# Patient Record
(demographics unavailable — no encounter records)

---

## 2024-12-26 NOTE — HISTORY OF PRESENT ILLNESS
[FreeTextEntry1] : follow up [de-identified] : Pt with Hyperlipidemia currently taking atorvastatin 10mg daily at bedtime, also has prediabetes, reports walking for physical activity, hasn't  made any diet modifications. She is feeling well and here for follow up, requesting refill of atorvastatin. Reports tolerating the medication well with no adverse effects.  Denies muscle and joint aches, myalgia, headaches, chest pain, palpitations, shortness of breath, dizziness, lightheadedness, nausea, vomiting, abdominal discomfort, diarrhea, constipation.  Has no other concerns today. Has CPE scheduled with PCP in april 2025.

## 2024-12-26 NOTE — HEALTH RISK ASSESSMENT
[0] : 2) Feeling down, depressed, or hopeless: Not at all (0) [PHQ-2 Negative - No further assessment needed] : PHQ-2 Negative - No further assessment needed [[id number]] : 0

## 2024-12-26 NOTE — PHYSICAL EXAM
[No Acute Distress] : no acute distress [Well Nourished] : well nourished [Well Developed] : well developed [Well-Appearing] : well-appearing [Normal Sclera/Conjunctiva] : normal sclera/conjunctiva [No Respiratory Distress] : no respiratory distress  [No Accessory Muscle Use] : no accessory muscle use [Clear to Auscultation] : lungs were clear to auscultation bilaterally [Normal Rate] : normal rate  [Regular Rhythm] : with a regular rhythm [Soft] : abdomen soft [Normal Bowel Sounds] : normal bowel sounds [Coordination Grossly Intact] : coordination grossly intact [Normal Gait] : normal gait [Speech Grossly Normal] : speech grossly normal [Normal Affect] : the affect was normal [Normal Mood] : the mood was normal [Normal Insight/Judgement] : insight and judgment were intact

## 2025-03-27 NOTE — HISTORY OF PRESENT ILLNESS
[FreeTextEntry1] : NP Mendy Rash [de-identified] : Bertha Holland 68 y/o F presents for rash on rt breast.   started about 1 month ago itchy scaling spot on R areola.  No similar rash on other breast.  using vaseline only  PHx of skin cancer: no FHx of skin cancer: no H/x of blistering sunburns: no H/x of tanning bed use: no Uses sunscreen regularly: no

## 2025-03-27 NOTE — ASSESSMENT
[FreeTextEntry1] : #Scaling plaque R breast.  #Undiagnosed new problem with uncertain prognosis Favor Dermatitis Education provided and treatment options reviewed.   -Frequent moisturization throughout day with thick cream/emollient such as vaseline , CeraVe or Vanicream reviewed  -For active rash: Triamcinolone 0.1% cream topically to affected areas BID x2 weeks, then 2 weeks on and 2 weeks off PRN thereafter.  Patient was informed not to use on face or intertriginous areas.  Patient was warned of long term adverse effects inlcuding atrophy, striae, telangiectasia, pigment alteration.  # Xerosis, chronic, not controlled  Patient counseled about dry skin care and liberal application of emollient creams such as Vanicream at least daily after showering.  Additionally: Take short, showers (avoid hot water), Use a mild body wash (eg. Cetaphil, Cerave, vanicream). Pat off excess water and put moisturizer on immediately     Good moisturizing choices on body include: Cetaphil moisturizing cream, Vanicream, Aquaphor ointment, or Vaseline if skin cannot tolerate other topicals.  Handout with instructions was provided to patient   RTC 6 weeks. if rash not resolved, plan to bx to r/o pagets

## 2025-03-27 NOTE — PHYSICAL EXAM
[FreeTextEntry3] : R areola: round scaling erythematous thin plaque. no underlying mass palpated  chest, back, extremities: diffusely xerotic skin

## 2025-04-21 NOTE — HEALTH RISK ASSESSMENT
[Good] : ~his/her~  mood as  good [No] : In the past 12 months have you used drugs other than those required for medical reasons? No [1] : 1) Little interest or pleasure doing things for several days (1) [0] : 2) Feeling down, depressed, or hopeless: Not at all (0) [PHQ-2 Negative - No further assessment needed] : PHQ-2 Negative - No further assessment needed [Audit-CScore] : 0 [de-identified] : walking [de-identified] : fruits, veggies  [[id number]] : 1 [Never] : Never [Patient reported mammogram was normal] : Patient reported mammogram was normal [Patient reported PAP Smear was normal] : Patient reported PAP Smear was normal [Patient reported colonoscopy was normal] : Patient reported colonoscopy was normal [HIV test declined] : HIV test declined [Hepatitis C test declined] : Hepatitis C test declined [Change in mental status noted] : No change in mental status noted [Language] : denies difficulty with language [Employed] : employed [] :  [MammogramDate] : 02/24 [ColonoscopyDate] : 01/20 [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

## 2025-04-21 NOTE — PLAN
[FreeTextEntry1] : follow up labs, labs drawn in office  encouraged healthy lifestyles, healthcare maintenance   anxiety, trouble with sleep  sending to therapy  discussed role of therapy/ supportive care  very sick  at home, trouble with sleep, will fall asleep and wake up with racing thoughts  will start with melatonin, if does not work, will try hydroxyzine discussed risks, benefits, side effects, alternatives, regimen